# Patient Record
(demographics unavailable — no encounter records)

---

## 2025-04-04 NOTE — HISTORY OF PRESENT ILLNESS
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 She reports mild pain along incisions but it is well controlled, minimal opiod use. no fevers or chills no pain in the foot, no claudication  6/5/20: Pt reports numbness along the ant lower leg, however no pain. overall feels well 12/11/20: numbness improving, but still present. otherwise no complaints. It does not impact her adls  6/4/21: mild residual orlando incisional numbness. Overall doing well on the left side. Main limitation is joint pain in the left knee. She does houwever endorse R ankle discomfort and swelling, worse at the end of the day. She does wear compression, which seems to help.  7/14/21: telephone visit. follow up for leg swelling. States it is not everyday and well managed w compression. US showed + reflux.  1/21/22: Pt recently presented to [facility] ED on 1/16/22 c/o pain behind the L knee, inability to bare weight, and erythema inferior to incision. Pt reports pain onset approximately 1 week ago and began to notice a small area of erythema inferior to her bypass scar 3 days ago. She denies fevers or chills. Deneis CP SoB, palpitations  2/11/22: doing well since surgery, pain tolerated. ambulating without difficulty. no fevers/chills  5/20/22: doing well. chronic L knee pain unchanged. R ankle pain also. ambulating  12/2/22: has chronic knee/joint pain on the left. the R ankle pain persists, has associated swelling  6/2/23: reports feeling a "snap" behind the knee a few weeks ago. now reports some soreness, swelling at the joint, but improving. RLE ankle pain unchanged  12/1/23: Ms. 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 She presents earlier than scheduled 1 year follow up for evaluation of new redness, increased heat, and slight discomfort of the left ankle. Continues with her baseline numbness to both feet. Her prior left knee pain has been greatly improved since she started receiving injections, but her walking is mainly limited by her right ankle. Otherwise she is doing well with no interval changes in her health.  s/p L GSV venaseal  2/23/24: doing well post venaseal ablation. sx in the left ankle have improved.  6/28/24: notices some discoloration in the left lateral ankle, but otherwise doing well. presents for bypass surveillance. [de-identified] : Presenting for new onset swelling of back of L knee noticed 3 weeks ago. Associated pain with ambulating/weight bearing, describred as "pulsating". No rest pain. Denies trauma.

## 2025-04-04 NOTE — END OF VISIT
[] : Resident [FreeTextEntry3] : pt w knee pain, suspect msk, may need orthopedic eval  annual bypass surveillance [Time Spent: ___ minutes] : I have spent [unfilled] minutes of time on the encounter which excludes teaching and separately reported services.

## 2025-04-04 NOTE — HISTORY OF PRESENT ILLNESS
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 She reports mild pain along incisions but it is well controlled, minimal opiod use. no fevers or chills no pain in the foot, no claudication  6/5/20: Pt reports numbness along the ant lower leg, however no pain. overall feels well 12/11/20: numbness improving, but still present. otherwise no complaints. It does not impact her adls  6/4/21: mild residual orlando incisional numbness. Overall doing well on the left side. Main limitation is joint pain in the left knee. She does houwever endorse R ankle discomfort and swelling, worse at the end of the day. She does wear compression, which seems to help.  7/14/21: telephone visit. follow up for leg swelling. States it is not everyday and well managed w compression. US showed + reflux.  1/21/22: Pt recently presented to [facility] ED on 1/16/22 c/o pain behind the L knee, inability to bare weight, and erythema inferior to incision. Pt reports pain onset approximately 1 week ago and began to notice a small area of erythema inferior to her bypass scar 3 days ago. She denies fevers or chills. Deneis CP SoB, palpitations  2/11/22: doing well since surgery, pain tolerated. ambulating without difficulty. no fevers/chills  5/20/22: doing well. chronic L knee pain unchanged. R ankle pain also. ambulating  12/2/22: has chronic knee/joint pain on the left. the R ankle pain persists, has associated swelling  6/2/23: reports feeling a "snap" behind the knee a few weeks ago. now reports some soreness, swelling at the joint, but improving. RLE ankle pain unchanged  12/1/23: Ms. 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 She presents earlier than scheduled 1 year follow up for evaluation of new redness, increased heat, and slight discomfort of the left ankle. Continues with her baseline numbness to both feet. Her prior left knee pain has been greatly improved since she started receiving injections, but her walking is mainly limited by her right ankle. Otherwise she is doing well with no interval changes in her health.  s/p L GSV venaseal  2/23/24: doing well post venaseal ablation. sx in the left ankle have improved.  6/28/24: notices some discoloration in the left lateral ankle, but otherwise doing well. presents for bypass surveillance. [de-identified] : Presenting for new onset swelling of back of L knee noticed 3 weeks ago. Associated pain with ambulating/weight bearing, describred as "pulsating". No rest pain. Denies trauma.

## 2025-04-04 NOTE — PHYSICAL EXAM
[JVD] : no jugular venous distention  [Respiratory Effort] : normal respiratory effort [Normal Rate and Rhythm] : normal rate and rhythm [Alert] : alert [Calm] : calm [de-identified] : NAD [FreeTextEntry1] : no LLE erythema, skin changes. minimal edema.    +palp L DP no mass palpated in pop fossa [de-identified] : no gross deficits

## 2025-04-04 NOTE — PHYSICAL EXAM
[JVD] : no jugular venous distention  [Respiratory Effort] : normal respiratory effort [Normal Rate and Rhythm] : normal rate and rhythm [Alert] : alert [Calm] : calm [de-identified] : NAD [FreeTextEntry1] : no LLE erythema, skin changes. minimal edema.    +palp L DP no mass palpated in pop fossa [de-identified] : no gross deficits